# Patient Record
(demographics unavailable — no encounter records)

---

## 2025-01-27 NOTE — PHYSICAL EXAM
[Normocephalic] : normocephalic [Atraumatic] : atraumatic [EOMI] : extra ocular movement intact [PERRL] : pupils equal, round and reactive to light [Sclera nonicteric] : sclera nonicteric [Conjunctiva pink] : conjunctiva pink [Supple] : supple [No Supraclavicular Adenopathy] : no supraclavicular adenopathy [No Cervical Adenopathy] : no cervical adenopathy [Examined in the supine and seated position] : examined in the supine and seated position [No Axillary Lymphadenopathy] : no left axillary lymphadenopathy [No Edema] : no edema [Symmetrical] : symmetrical [Bra Size: ___] : Bra Size: [unfilled] [Grade 2] : Ptosis Grade 2 [No dominant masses] : no dominant masses left breast [No Nipple Retraction] : no left nipple retraction [No Nipple Discharge] : no left nipple discharge [de-identified] : L>R [de-identified] : Roughly 2cm, mobile, well-circumscribed mass in the retroareolar right breast

## 2025-01-27 NOTE — PHYSICAL EXAM
[Normocephalic] : normocephalic [Atraumatic] : atraumatic [EOMI] : extra ocular movement intact [PERRL] : pupils equal, round and reactive to light [Sclera nonicteric] : sclera nonicteric [Conjunctiva pink] : conjunctiva pink [Supple] : supple [No Supraclavicular Adenopathy] : no supraclavicular adenopathy [No Cervical Adenopathy] : no cervical adenopathy [Examined in the supine and seated position] : examined in the supine and seated position [No Axillary Lymphadenopathy] : no left axillary lymphadenopathy [No Edema] : no edema [Symmetrical] : symmetrical [Bra Size: ___] : Bra Size: [unfilled] [Grade 2] : Ptosis Grade 2 [No dominant masses] : no dominant masses left breast [No Nipple Retraction] : no left nipple retraction [No Nipple Discharge] : no left nipple discharge [de-identified] : L>R [de-identified] : Roughly 2cm, mobile, well-circumscribed mass in the retroareolar right breast

## 2025-01-27 NOTE — PAST MEDICAL HISTORY
[Menstruating] : The patient is menstruating [Menarche Age ____] : age at menarche was [unfilled] [Definite ___ (Date)] : the last menstrual period was [unfilled] [Total Preg ___] : G[unfilled] [Live Births ___] : P[unfilled]  [Age At Live Birth ___] : Age at live birth: [unfilled] [History of Hormone Replacement Treatment] : has no history of hormone replacement treatment [FreeTextEntry5] : DNC, IVF  [FreeTextEntry6] : yes IVF for 1 year [FreeTextEntry7] : yes in the past for 10 years [FreeTextEntry8] : 2 years

## 2025-01-27 NOTE — HISTORY OF PRESENT ILLNESS
[FreeTextEntry1] : Problem List:  1. Right breast retroareolar fibroadenoma increased in size       -US measures 1. 8 X 1.8 X 1.0 cm (from 1.3 x 0.7 x 1.3cm in  --> 1.5 X 1. 4 X 1.0 cm )  2. Left 12:00 fibroadenomatoid lesion, stable since       -US 0. 7 x 0. 8 x 0. 6 cm   Care Team:  PCP- Dr. Hernadez   HPI: Patient is a 43 year old female presenting on 25 for enlarging right breast fibroadenoma. Patient underwent screening mammogram and ultrasound bilateral on 01/15/25 that demonstrated in the retroareolar right breast an oval lobulated heterogenous hypoechoic solid mass measuring 1. 8 x 1.8 x 1.0 cm. This mass was initially biopsied in  and measured 1.3 x 0.7 x 1.3cm at that time. Most recent US from  measured 1. 5 x 1. 4 x 1.0 cm. In the left breast 12:00 position 3 cm FN, there is a stable oval circumscribed hypoechoic mass measuring 0. 7 x 0. 8 x 0. 6 cm, previously biopsied fibroadenomatoid lesion. Patient denies breast pain or nipple discharge. She is now able to palpate the mass on self breast exam.   Breast History: Bilateral breast biopsies in  as above. Family history of breast and ovarian cancer maternal grandmother age 70. Mother tested for BRCA was negative.   No AC or Aspirin use  Penicillin allergy, no latex allergy  Imagin/15/25: Quintin: Screening Mammogram Bilateral and US Breast Complete: Density B, Impression - In the retroareolar right breast there is an oval lobulated heterogenous hypoechoic solid mass measuring 1. 8 x 1.8 x 1.0 cm, This mass most recently measured 1. 5 x 1. 4 x 1.0 cm. Given the interval increase in size breast surgical consultation recommended. In the left breast 12:00 position 3 cm FN there is a stable oval circumscribed hypoechoic mass measuring 0. 7 x 0. 8 x 0. 6 cm.  BI-RADS 4

## 2025-01-27 NOTE — ASSESSMENT
[FreeTextEntry1] : 43 year old female with bilateral breast fibroadenomas, the right has been increasing in size since '22.   Reviewed all recent imaging and prior pathology results with patient. We discussed the patient's diagnosis of fibroadenoma and its pathophysiology. We discussed that these are most common in young women, can fluctuate in size, and often times regress after menopause. We discussed that these are benign masses and do not increase the breast cancer risk by much, if at all. They can sometimes be sources of pain/discomfort but can also by asymptomatic. We discussed the treatment options including continued surveillance vs surgical excision, and reasons for excision including enlarging size and pain. We discussed management options including continued surveillance with repeat US in 6 months, repeat biopsy to ensure no other pathologic findings, or surgical excision. We discussed the pros and cons of each option. She elected for continued observation with repeat US in 6 months with the understanding that if there is continued growth, surgical excision would be highly recommended. I also advised her to call our office with any palpable changes or if she decides to pursue surgical excision sooner.   I will see her back in 6 months after her repeat US for another CBE and to discuss next steps.   Patient verbalized understanding of all treatment plans. All of her questions were answered to the best of my ability. She was encouraged to call the office for any questions or concerns sooner.   Plan:  1. Right breast US July '25 2. Followup after for CBE

## 2025-07-23 NOTE — HISTORY OF PRESENT ILLNESS
[FreeTextEntry1] : Problem List:  1. Right breast retroareolar fibroadenoma increased in size       -US measures 1. 8 X 1.8 X 1.0 cm (from 1.3 x 0.7 x 1.3cm in  --> 1.5 X 1. 4 X 1.0 cm )  2. Left 12:00 fibroadenomatoid lesion, stable since       -US 0. 7 x 0. 8 x 0. 6 cm   Care Team:  PCP- Dr. Hernadez   Interval History:  (25): Patient presents for follow up.   HPI: Patient is a 43 year old female presenting on 25 for enlarging right breast fibroadenoma. Patient underwent screening mammogram and ultrasound bilateral on 01/15/25 that demonstrated in the retroareolar right breast an oval lobulated heterogenous hypoechoic solid mass measuring 1. 8 x 1.8 x 1.0 cm. This mass was initially biopsied in  and measured 1.3 x 0.7 x 1.3cm at that time. Most recent US from  measured 1. 5 x 1. 4 x 1.0 cm. In the left breast 12:00 position 3 cm FN, there is a stable oval circumscribed hypoechoic mass measuring 0. 7 x 0. 8 x 0. 6 cm, previously biopsied fibroadenomatoid lesion. Patient denies breast pain or nipple discharge. She is now able to palpate the mass on self breast exam.   Breast History: Bilateral breast biopsies in  as above. Family history of breast and ovarian cancer maternal grandmother age 70. Mother tested for BRCA was negative.   No AC or Aspirin use  Penicillin allergy, no latex allergy  Imagin/15/25: Quintin: Screening Mammogram Bilateral and US Breast Complete: Density B, Impression - In the retroareolar right breast there is an oval lobulated heterogenous hypoechoic solid mass measuring 1. 8 x 1.8 x 1.0 cm, This mass most recently measured 1. 5 x 1. 4 x 1.0 cm. Given the interval increase in size breast surgical consultation recommended. In the left breast 12:00 position 3 cm FN there is a stable oval circumscribed hypoechoic mass measuring 0. 7 x 0. 8 x 0. 6 cm.  BI-RADS 4   25: Quintin: Ultrasound Breast Right Limited: Impression - Since 1/15/2025 no interval change in 1.8 cm oval mass retroareolar right breast, previously biopsied fibro adenomatoid lesion. However, please note the mass increased in size when compared with  study. Although there has been no change in the past 6 months, repeat ultrasound guided core biopsy or excision may be considered. BI-RADS 4

## 2025-07-23 NOTE — PHYSICAL EXAM
[Normocephalic] : normocephalic [Atraumatic] : atraumatic [EOMI] : extra ocular movement intact [PERRL] : pupils equal, round and reactive to light [Sclera nonicteric] : sclera nonicteric [Conjunctiva pink] : conjunctiva pink [Supple] : supple [No Supraclavicular Adenopathy] : no supraclavicular adenopathy [No Cervical Adenopathy] : no cervical adenopathy [Examined in the supine and seated position] : examined in the supine and seated position [Symmetrical] : symmetrical [Bra Size: ___] : Bra Size: [unfilled] [Grade 2] : Ptosis Grade 2 [No dominant masses] : no dominant masses left breast [No Nipple Retraction] : no left nipple retraction [No Nipple Discharge] : no left nipple discharge [No Axillary Lymphadenopathy] : no left axillary lymphadenopathy [No Edema] : no edema [de-identified] : L>R [de-identified] : Roughly 2cm, mobile, well-circumscribed mass in the retroareolar right breast

## 2025-07-23 NOTE — PAST MEDICAL HISTORY
[Menstruating] : The patient is menstruating [Menarche Age ____] : age at menarche was [unfilled] [History of Hormone Replacement Treatment] : has no history of hormone replacement treatment [Definite ___ (Date)] : the last menstrual period was [unfilled] [Total Preg ___] : G[unfilled] [Live Births ___] : P[unfilled]  [Age At Live Birth ___] : Age at live birth: [unfilled] [FreeTextEntry5] : DNC, IVF  [FreeTextEntry6] : yes IVF for 1 year [FreeTextEntry7] : yes in the past for 10 years [FreeTextEntry8] : 2 years